# Patient Record
Sex: FEMALE | Race: BLACK OR AFRICAN AMERICAN | Employment: FULL TIME | URBAN - METROPOLITAN AREA
[De-identification: names, ages, dates, MRNs, and addresses within clinical notes are randomized per-mention and may not be internally consistent; named-entity substitution may affect disease eponyms.]

---

## 2019-08-21 ENCOUNTER — OFFICE VISIT (OUTPATIENT)
Dept: ENDOCRINOLOGY | Age: 29
End: 2019-08-21

## 2019-08-21 VITALS
SYSTOLIC BLOOD PRESSURE: 125 MMHG | OXYGEN SATURATION: 99 % | WEIGHT: 187.9 LBS | DIASTOLIC BLOOD PRESSURE: 81 MMHG | RESPIRATION RATE: 18 BRPM | HEIGHT: 66 IN | TEMPERATURE: 97.8 F | BODY MASS INDEX: 30.2 KG/M2 | HEART RATE: 78 BPM

## 2019-08-21 DIAGNOSIS — E07.9 DISORDER OF THYROID, UNSPECIFIED: Primary | ICD-10-CM

## 2019-08-21 DIAGNOSIS — E28.2 PCOS (POLYCYSTIC OVARIAN SYNDROME): ICD-10-CM

## 2019-08-21 DIAGNOSIS — E55.9 VITAMIN D DEFICIENCY: ICD-10-CM

## 2019-08-21 NOTE — PROGRESS NOTES
1. Have you been to the ER, urgent care clinic since your last visit? No Hospitalized since your last visit? No    2. Have you seen or consulted any other health care providers outside of the [address] [place] [address] since your last visit? Include any pap smears or colon screening.  No     Wt Readings from Last 3 Encounters:   08/21/19 187 lb 14.4 oz (85.2 kg)   02/14/13 218 lb (98.9 kg)     Temp Readings from Last 3 Encounters:   08/21/19 97.8 °F (36.6 °C) (Oral)   02/17/13 98.8 °F (37.1 °C)     BP Readings from Last 3 Encounters:   08/21/19 125/81   02/17/13 (!) 170/94     Pulse Readings from Last 3 Encounters:   08/21/19 78   02/17/13 85

## 2019-08-21 NOTE — PROGRESS NOTES
HISTORY OF PRESENT ILLNESS  Grzegorz Avina is a 30 y.o. female. HPI  Initial visit for Thyroid problem   She had seen me as gestational patient  In 2012 for low TSH       She is tired and finds her hair is thinning   She is on mirena and this is the last year         History reviewed. No pertinent past medical history. Social History     Socioeconomic History    Marital status: SINGLE     Spouse name: Not on file    Number of children: Not on file    Years of education: Not on file    Highest education level: Not on file   Occupational History    Not on file   Social Needs    Financial resource strain: Not on file    Food insecurity:     Worry: Not on file     Inability: Not on file    Transportation needs:     Medical: Not on file     Non-medical: Not on file   Tobacco Use    Smoking status: Former Smoker    Smokeless tobacco: Never Used   Substance and Sexual Activity    Alcohol use: No    Drug use: No    Sexual activity: Yes     Partners: Male     Birth control/protection: None   Lifestyle    Physical activity:     Days per week: Not on file     Minutes per session: Not on file    Stress: Not on file   Relationships    Social connections:     Talks on phone: Not on file     Gets together: Not on file     Attends Congregation service: Not on file     Active member of club or organization: Not on file     Attends meetings of clubs or organizations: Not on file     Relationship status: Not on file    Intimate partner violence:     Fear of current or ex partner: Not on file     Emotionally abused: Not on file     Physically abused: Not on file     Forced sexual activity: Not on file   Other Topics Concern    Not on file   Social History Narrative    Not on file       Family History   Problem Relation Age of Onset    Diabetes Maternal Grandmother     Hypertension Maternal Grandmother        Review of Systems   Constitutional: Negative. HENT: Negative.     Eyes: Negative for pain and redness. Respiratory: Negative. Cardiovascular: Negative for chest pain, palpitations and leg swelling. Gastrointestinal: Negative. Negative for constipation. Genitourinary: Negative. Musculoskeletal: Negative for myalgias. Skin: Negative. Neurological: Negative. Endo/Heme/Allergies: Negative. Psychiatric/Behavioral: Negative for depression and memory loss. The patient does not have insomnia. Physical Exam   Constitutional: She is oriented to person, place, and time. She appears well-developed and well-nourished. HENT:   Head: Normocephalic. Eyes: Pupils are equal, round, and reactive to light. Conjunctivae and EOM are normal.   Neck: Normal range of motion. Neck supple. No JVD present. No tracheal deviation present. No thyromegaly present. Cardiovascular: Normal rate, regular rhythm and normal heart sounds. No murmur heard. Pulmonary/Chest: Breath sounds normal.   Abdominal: Soft. Bowel sounds are normal.   Musculoskeletal: Normal range of motion. Lymphadenopathy:     She has no cervical adenopathy. Neurological: She is alert and oriented to person, place, and time. She has normal reflexes. Skin: Skin is warm. Psychiatric: She has a normal mood and affect. ASSESSMENT and PLAN    1. thyroid disorder unspecified   Patient looks clinically euthyroid and highly doubt that thyroid could be the cause for her fatigue or for hair loss  Anyway as she requested will do the thyroid labs    2.  Hair loss  :  Rule out hyperandrogenic states and vit d def     Patient will follow-up with me only when the labs are abnormal  > 50 % of time is spent on counseling   Patient voiced understanding her plan of care

## 2019-08-21 NOTE — Clinical Note
8/21/19 Patient: Duncan Morillo YOB: 1990 Date of Visit: 8/21/2019 Yanet Morales MD 
Patient Can Only Remember The Practice Name And Not The Physician 
VIA Dear Ilan Morales MD, Thank you for referring Ms. Valentina Trinh to [facility] for evaluation. My notes for this consultation are attached. If you have questions, please do not hesitate to call me. I look forward to following your patient along with you. Sincerely, Hazel Frausto MD

## 2019-08-23 LAB
25(OH)D3+25(OH)D2 SERPL-MCNC: 27.3 NG/ML (ref 30–100)
FSH SERPL-ACNC: 6.6 MIU/ML
TESTOST SERPL-MCNC: 18.8 NG/DL (ref 10–55)
THYROPEROXIDASE AB SERPL-ACNC: 15 IU/ML (ref 0–34)
TSH SERPL DL<=0.005 MIU/L-ACNC: 0.51 UIU/ML (ref 0.45–4.5)

## 2019-08-23 NOTE — PROGRESS NOTES
She has normal labs but for very slightly low vit d   She can just do vit D intake of 800 to 1000 int units OTC

## 2020-09-01 ENCOUNTER — OFFICE VISIT (OUTPATIENT)
Dept: OBGYN CLINIC | Age: 30
End: 2020-09-01
Payer: COMMERCIAL

## 2020-09-01 VITALS
HEIGHT: 67 IN | WEIGHT: 195.13 LBS | DIASTOLIC BLOOD PRESSURE: 70 MMHG | BODY MASS INDEX: 30.63 KG/M2 | SYSTOLIC BLOOD PRESSURE: 120 MMHG

## 2020-09-01 DIAGNOSIS — Z30.432 ENCOUNTER FOR IUD REMOVAL: ICD-10-CM

## 2020-09-01 DIAGNOSIS — N92.6 IRREGULAR MENSES: ICD-10-CM

## 2020-09-01 DIAGNOSIS — Z00.00 ANNUAL VISIT FOR GENERAL ADULT MEDICAL EXAMINATION WITHOUT ABNORMAL FINDINGS: Primary | ICD-10-CM

## 2020-09-01 PROCEDURE — 99395 PREV VISIT EST AGE 18-39: CPT | Performed by: OBSTETRICS & GYNECOLOGY

## 2020-09-01 PROCEDURE — 58301 REMOVE INTRAUTERINE DEVICE: CPT | Performed by: OBSTETRICS & GYNECOLOGY

## 2020-09-01 RX ORDER — NORGESTIMATE AND ETHINYL ESTRADIOL 0.25-0.035
1 KIT ORAL DAILY
Qty: 1 DOSE PACK | Refills: 1 | Status: SHIPPED | OUTPATIENT
Start: 2020-09-01 | End: 2020-11-16 | Stop reason: ALTCHOICE

## 2020-09-01 RX ORDER — ACYCLOVIR 50 MG/G
OINTMENT TOPICAL
COMMUNITY

## 2020-09-01 NOTE — PROGRESS NOTES
Robb Horner is a 35 y.o. female who presents today for the following:  Chief Complaint   Patient presents with    Annual Exam     Pt presents for annual exam with no complaints. Pt Mirena  3/2020 and would like to have another one //Ana Cristina         No Known Allergies    Current Outpatient Medications   Medication Sig    norgestimate-ethinyl estradioL (ORTHO-CYCLEN, SPRINTEC) 0.25-35 mg-mcg tab Take 1 Tab by mouth daily.  acyclovir (ZOVIRAX) 5 % ointment acyclovir 5 % topical ointment   APPLY SMALL AMOUNT TO THE AFFECTED AREA(S) BY TOPICAL ROUTE TWICE PER DAY    ibuprofen (MOTRIN) 800 mg tablet Take 1 Tab by mouth every eight (8) hours.  oxyCODONE-acetaminophen (PERCOCET) 5-325 mg per tablet Take 1 Tab by mouth every six (6) hours as needed (May repeat dose up to one hour after current dose if no relief.  Do not exceed 2 doses every 3 hours. ).  PNV Ca#37-Iron Cb,As,Gl-FA-OM3 27-1-330 mg cap Take  by mouth. No current facility-administered medications for this visit. No past medical history on file.     Past Surgical History:   Procedure Laterality Date     DELIVERY ONLY         Family History   Problem Relation Age of Onset    Diabetes Maternal Grandmother     Hypertension Maternal Grandmother     Heart Disease Maternal Grandfather        Social History     Socioeconomic History    Marital status:      Spouse name: Not on file    Number of children: Not on file    Years of education: Not on file    Highest education level: Not on file   Occupational History    Not on file   Social Needs    Financial resource strain: Not on file    Food insecurity     Worry: Not on file     Inability: Not on file    Transportation needs     Medical: Not on file     Non-medical: Not on file   Tobacco Use    Smoking status: Former Smoker    Smokeless tobacco: Never Used   Substance and Sexual Activity    Alcohol use: No    Drug use: No    Sexual activity: Yes Partners: Male     Birth control/protection: None   Lifestyle    Physical activity     Days per week: Not on file     Minutes per session: Not on file    Stress: Not on file   Relationships    Social connections     Talks on phone: Not on file     Gets together: Not on file     Attends Cheondoism service: Not on file     Active member of club or organization: Not on file     Attends meetings of clubs or organizations: Not on file     Relationship status: Not on file    Intimate partner violence     Fear of current or ex partner: Not on file     Emotionally abused: Not on file     Physically abused: Not on file     Forced sexual activity: Not on file   Other Topics Concern    Not on file   Social History Narrative    Not on file         ROS   Review of Systems   Constitutional: Negative. HENT: Negative. Eyes: Negative. Respiratory: Negative. Cardiovascular: Negative. Gastrointestinal: Negative. Genitourinary: Negative. Musculoskeletal: Negative. Skin: Negative. Neurological: Negative. Endo/Heme/Allergies: Negative. Psychiatric/Behavioral: Negative. /70   Ht 5' 7\" (1.702 m)   Wt 195 lb 2 oz (88.5 kg)   BMI 30.56 kg/m²    OBGyn Exam   Constitutional  · Appearance: well-nourished, well developed, alert, in no acute distress    HENT  · Head and Face: appears normal    Neck  · Inspection/Palpation: normal appearance, no masses or tenderness  · Lymph Nodes: no lymphadenopathy present  · Thyroid: gland size normal, nontender, no nodules or masses present on palpation    Breasts   Symmetric, no palpable masses, no tenderness, no skin changes, no nipple abnormality, no nipple discharge, no lymphadenopathy.     Chest  · Respiratory Effort: breathing labored  · Auscultation: normal breath sounds    Cardiovascular  · Heart:  · Auscultation: regular rate and rhythm without murmur    Gastrointestinal  · Abdominal Examination: abdomen non-tender to palpation, normal bowel sounds, no masses present  · Liver and spleen: no hepatomegaly present, spleen not palpable  · Hernias: no hernias identified    Genitourinary  · External Genitalia: normal appearance for age, no discharge present, no tenderness present, no inflammatory lesions present, no masses present, no atrophy present  · Vagina: normal vaginal vault without central or paravaginal defects, no discharge present, no inflammatory lesions present, no masses present  · Bladder: non-tender to palpation  · Urethra: appears normal  · Cervix: normal   · Uterus: normal size, shape and consistency  · Adnexa: no adnexal tenderness present, no adnexal masses present  · Perineum: perineum within normal limits, no evidence of trauma, no rashes or skin lesions present  · Anus: anus within normal limits, no hemorrhoids present  · Inguinal Lymph Nodes: no lymphadenopathy present    Skin  · General Inspection: no rash, no lesions identified    Neurologic/Psychiatric  · Mental Status:  · Orientation: grossly oriented to person, place and time  · Mood and Affect: mood normal, affect appropriate    Results for orders placed or performed in visit on 09/01/20   REMOVE INTRAUTERINE DEVICE    Impression    IUD REMOVAL        Orders Placed This Encounter    REMOVE INTRAUTERINE DEVICE    acyclovir (ZOVIRAX) 5 % ointment     Sig: acyclovir 5 % topical ointment   APPLY SMALL AMOUNT TO THE AFFECTED AREA(S) BY TOPICAL ROUTE TWICE PER DAY    norgestimate-ethinyl estradioL (ORTHO-CYCLEN, SPRINTEC) 0.25-35 mg-mcg tab     Sig: Take 1 Tab by mouth daily. Dispense:  1 Dose Pack     Refill:  1    PAP IG, HPV AND RFX HPV 67/11,54([id number]) ([facility])     Order Specific Question:   Pap Source? Answer:   Cervical     Order Specific Question:   Total Hysterectomy? Answer:   No     Order Specific Question:   Supracervical Hysterectomy? Answer:   No     Order Specific Question:   Post Menopausal?     Answer:   No     Order Specific Question:   Hormone Therapy? Answer:   No     Order Specific Question:   IUD? Answer:   No     Order Specific Question:   Abnormal Bleeding? Answer:   No     Order Specific Question:   Pregnant     Answer:   No     Order Specific Question:   Post Partum? Answer:   No         1. Annual visit for general adult medical examination without abnormal findings    - PAP IG, HPV AND RFX HPV 82/27,32([id number])    2. Encounter for IUD removal    - REMOVE INTRAUTERINE DEVICE  - NEW IUD ORDERED    3. Irregular menses    - norgestimate-ethinyl estradioL (Larisa Abraham) 0.25-35 mg-mcg tab; Take 1 Tab by mouth daily. Dispense: 1 Dose Pack; Refill: 1        Follow-up and Dispositions    · Return in about 2 weeks (around 9/15/2020).

## 2020-09-11 LAB
CYTOLOGIST CVX/VAG CYTO: NORMAL
CYTOLOGY CVX/VAG DOC CYTO: NORMAL
CYTOLOGY CVX/VAG DOC THIN PREP: NORMAL
DX ICD CODE: NORMAL
HPV I/H RISK 1 DNA CVX QL PROBE+SIG AMP: NEGATIVE
Lab: NORMAL
OTHER STN SPEC: NORMAL
STAT OF ADQ CVX/VAG CYTO-IMP: NORMAL

## 2020-11-16 ENCOUNTER — OFFICE VISIT (OUTPATIENT)
Dept: OBGYN CLINIC | Age: 30
End: 2020-11-16
Payer: COMMERCIAL

## 2020-11-16 VITALS
HEIGHT: 67 IN | SYSTOLIC BLOOD PRESSURE: 122 MMHG | WEIGHT: 192 LBS | DIASTOLIC BLOOD PRESSURE: 68 MMHG | BODY MASS INDEX: 30.13 KG/M2

## 2020-11-16 DIAGNOSIS — Z30.430 ENCOUNTER FOR IUD INSERTION: ICD-10-CM

## 2020-11-16 DIAGNOSIS — N94.89 SUPPRESSION OF MENSES: Primary | ICD-10-CM

## 2020-11-16 LAB
HCG URINE, QL. (POC): NEGATIVE
VALID INTERNAL CONTROL?: YES

## 2020-11-16 PROCEDURE — 58300 INSERT INTRAUTERINE DEVICE: CPT | Performed by: OBSTETRICS & GYNECOLOGY

## 2020-11-16 PROCEDURE — 81025 URINE PREGNANCY TEST: CPT | Performed by: OBSTETRICS & GYNECOLOGY

## 2020-11-16 PROCEDURE — 99213 OFFICE O/P EST LOW 20 MIN: CPT | Performed by: OBSTETRICS & GYNECOLOGY

## 2020-11-16 RX ORDER — LEVONORGESTREL 52 MG/1
INTRAUTERINE DEVICE INTRAUTERINE
COMMUNITY
Start: 2020-09-24

## 2020-11-16 NOTE — PROGRESS NOTES
Subjective:  Chief Complaints:       1. IUD Insertion. HPI:         Anita Britt is a 35 y.o. female who came to today for IUD insertion. She was previously counseled concerning risks, benefits,side effects and alternatives. Risks include infection, excess bleeding, perforation of uterus and internal organs, rejection. All questions were answered. She denies an active vaginal infection and pregnancy. Current Outpatient Medications   Medication Sig    Mirena 20 mcg/24 hours (6 yrs) 52 mg IUD     acyclovir (ZOVIRAX) 5 % ointment acyclovir 5 % topical ointment   APPLY SMALL AMOUNT TO THE AFFECTED AREA(S) BY TOPICAL ROUTE TWICE PER DAY    ibuprofen (MOTRIN) 800 mg tablet Take 1 Tab by mouth every eight (8) hours.  oxyCODONE-acetaminophen (PERCOCET) 5-325 mg per tablet Take 1 Tab by mouth every six (6) hours as needed (May repeat dose up to one hour after current dose if no relief.  Do not exceed 2 doses every 3 hours. ).  PNV Ca#37-Iron Cb,As,Gl-FA-OM3 27-1-330 mg cap Take  by mouth. No current facility-administered medications for this visit. Objective:  Physical Examination:  GENERAL:     General: alert and oriented x 3, well developed and well nourished. HEART: regular rate and rhythm, normal S1S2, no murmurs. LUNGS:  clear to auscultation bilaterally, no wheezes, rhonchi/rales. GENITOURINARY - FEMALE:      EXTERNAL GENITALS: normal, no lesions. Vagina:  pink/moist mucosa, without any lesions,. Cervix: downward, normal appearing, : cervical movement tenderness. Assessment:  The encounter diagnosis was Suppression of menses. Procedures:  IUD Insertion:  Consent informed consent obtained, Pregnancy test negative, 30 second time out taken. Prep: the cervix was prepped with betadine. Procedure: a single-tooth tenaculum was placed, the uterus was sounded and found to be 6 cm, the mirena was inserted without difficulty, the strings were cut to 2cm in length.   Post procedure: the patient tolerated procedure well, instructed to take NSAIDS as directed for cramping, informed her that cramping and spotting are to be expected.     Results for orders placed or performed in visit on 11/16/20   AMB POC URINE PREGNANCY TEST, VISUAL COLOR COMPARISON   Result Value Ref Range    VALID INTERNAL CONTROL POC Yes     HCG urine, Ql. (POC) Negative Negative        Orders Placed This Encounter    AMB POC URINE PREGNANCY TEST, VISUAL COLOR COMPARISON    Mirena 20 mcg/24 hours (6 yrs) 52 mg IUD    levonorgestreL (MIRENA) 20 mcg/24 hours (6 yrs) 52 mg IUD 20 mcg       Follow Up:

## 2023-05-12 RX ORDER — OXYCODONE HYDROCHLORIDE AND ACETAMINOPHEN 5; 325 MG/1; MG/1
1 TABLET ORAL EVERY 6 HOURS PRN
COMMUNITY
Start: 2013-02-17

## 2023-05-12 RX ORDER — IBUPROFEN 800 MG/1
TABLET ORAL EVERY 8 HOURS
COMMUNITY
Start: 2013-02-17

## 2023-05-12 RX ORDER — LEVONORGESTREL 52 MG/1
INTRAUTERINE DEVICE INTRAUTERINE
COMMUNITY
Start: 2020-09-24

## 2023-05-12 RX ORDER — ACYCLOVIR 50 MG/G
OINTMENT TOPICAL
COMMUNITY